# Patient Record
Sex: MALE | Race: WHITE | NOT HISPANIC OR LATINO | Employment: FULL TIME | URBAN - METROPOLITAN AREA
[De-identification: names, ages, dates, MRNs, and addresses within clinical notes are randomized per-mention and may not be internally consistent; named-entity substitution may affect disease eponyms.]

---

## 2019-12-31 ENCOUNTER — HOSPITAL ENCOUNTER (EMERGENCY)
Facility: HOSPITAL | Age: 34
Discharge: HOME | End: 2019-12-31
Attending: EMERGENCY MEDICINE | Admitting: EMERGENCY MEDICINE

## 2019-12-31 ENCOUNTER — APPOINTMENT (EMERGENCY)
Dept: RADIOLOGY | Facility: HOSPITAL | Age: 34
End: 2019-12-31

## 2019-12-31 VITALS
DIASTOLIC BLOOD PRESSURE: 79 MMHG | HEART RATE: 80 BPM | RESPIRATION RATE: 18 BRPM | SYSTOLIC BLOOD PRESSURE: 129 MMHG | TEMPERATURE: 98.2 F | OXYGEN SATURATION: 98 %
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
## 2019-12-31 DIAGNOSIS — S01.01XA LACERATION OF SCALP, INITIAL ENCOUNTER: ICD-10-CM

## 2019-12-31 LAB — GLUCOSE SERPL-MCNC: 83 MG/DL (ref 65–140)

## 2019-12-31 PROCEDURE — 90715 TDAP VACCINE 7 YRS/> IM: CPT | Performed by: EMERGENCY MEDICINE

## 2019-12-31 PROCEDURE — 72125 CT NECK SPINE W/O DYE: CPT

## 2019-12-31 PROCEDURE — 99285 EMERGENCY DEPT VISIT HI MDM: CPT

## 2019-12-31 PROCEDURE — 93005 ELECTROCARDIOGRAM TRACING: CPT

## 2019-12-31 PROCEDURE — 90471 IMMUNIZATION ADMIN: CPT

## 2019-12-31 PROCEDURE — 70450 CT HEAD/BRAIN W/O DYE: CPT

## 2019-12-31 PROCEDURE — 82948 REAGENT STRIP/BLOOD GLUCOSE: CPT

## 2019-12-31 RX ORDER — LIDOCAINE HYDROCHLORIDE AND EPINEPHRINE 10; 10 MG/ML; UG/ML
20 INJECTION, SOLUTION INFILTRATION; PERINEURAL ONCE
Status: COMPLETED | OUTPATIENT
Start: 2019-12-31 | End: 2019-12-31

## 2019-12-31 RX ORDER — CLINDAMYCIN HYDROCHLORIDE 300 MG/1
300 CAPSULE ORAL 3 TIMES DAILY
Qty: 15 CAPSULE | Refills: 0 | Status: SHIPPED | OUTPATIENT
Start: 2019-12-31 | End: 2020-01-05

## 2019-12-31 RX ORDER — DIAPER,BRIEF,INFANT-TODD,DISP
1 EACH MISCELLANEOUS 2 TIMES DAILY
Qty: 453.6 G | Refills: 0 | Status: SHIPPED | OUTPATIENT
Start: 2019-12-31 | End: 2020-01-07

## 2019-12-31 RX ORDER — IBUPROFEN 600 MG/1
600 TABLET ORAL ONCE
Status: COMPLETED | OUTPATIENT
Start: 2019-12-31 | End: 2019-12-31

## 2019-12-31 RX ADMIN — TETANUS TOXOID, REDUCED DIPHTHERIA TOXOID AND ACELLULAR PERTUSSIS VACCINE, ADSORBED 0.5 ML: 5; 2.5; 8; 8; 2.5 SUSPENSION INTRAMUSCULAR at 18:28

## 2019-12-31 RX ADMIN — IBUPROFEN 600 MG: 600 TABLET, FILM COATED ORAL at 19:00

## 2019-12-31 RX ADMIN — LIDOCAINE HYDROCHLORIDE,EPINEPHRINE BITARTRATE 20 ML: 10; .01 INJECTION, SOLUTION INFILTRATION; PERINEURAL at 18:15

## 2019-12-31 NOTE — DISCHARGE INSTRUCTIONS
Please return to the ED in 10 days for suture removal   Keep the area clean apply the bacitracin ointment and taking antibiotics  Watch out for signs of infection such as drainage redness around the wound in which case you need to come to the ED immediately for staple removal   Please gently wash around the wound and keep it from getting crusted
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
## 2020-01-01 NOTE — ED NOTES
Patient's girlfriend at bedside, present when scripts and DC instructions given to patient     Remi Grant RN  12/31/19 0531

## 2020-01-02 LAB
ATRIAL RATE: 82 BPM
P AXIS: 64 DEGREES
PR INTERVAL: 164 MS
QRS AXIS: 44 DEGREES
QRSD INTERVAL: 92 MS
QT INTERVAL: 364 MS
QTC INTERVAL: 425 MS
T WAVE AXIS: 39 DEGREES
VENTRICULAR RATE: 82 BPM

## 2020-01-02 PROCEDURE — 93010 ELECTROCARDIOGRAM REPORT: CPT | Performed by: INTERNAL MEDICINE

## 2021-04-25 ENCOUNTER — APPOINTMENT (EMERGENCY)
Dept: RADIOLOGY | Facility: HOSPITAL | Age: 36
End: 2021-04-25
Payer: COMMERCIAL

## 2021-04-25 ENCOUNTER — HOSPITAL ENCOUNTER (EMERGENCY)
Facility: HOSPITAL | Age: 36
Discharge: HOME | End: 2021-04-25
Attending: SURGERY
Payer: COMMERCIAL

## 2021-04-25 VITALS
OXYGEN SATURATION: 97 % | WEIGHT: 190.7 LBS | TEMPERATURE: 98.8 F | HEART RATE: 86 BPM | SYSTOLIC BLOOD PRESSURE: 132 MMHG | DIASTOLIC BLOOD PRESSURE: 82 MMHG | RESPIRATION RATE: 20 BRPM

## 2021-04-25 DIAGNOSIS — M79.606 LEG PAIN: ICD-10-CM

## 2021-04-25 DIAGNOSIS — S02.5XXA TOOTH FRACTURE: ICD-10-CM

## 2021-04-25 DIAGNOSIS — M79.643 HAND PAIN: ICD-10-CM
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
## 2021-04-25 DIAGNOSIS — K05.219 PERIODONTAL ABSCESS: ICD-10-CM

## 2021-04-25 DIAGNOSIS — M25.529 ELBOW PAIN: ICD-10-CM

## 2021-04-25 LAB
BASE EXCESS BLDA CALC-SCNC: 2 MMOL/L (ref -2–3)
GLUCOSE SERPL-MCNC: 123 MG/DL (ref 65–140)
HCO3 BLDA-SCNC: 27.4 MMOL/L (ref 24–30)
HCT VFR BLD CALC: 44 % (ref 36.5–49.3)
HGB BLDA-MCNC: 15 G/DL (ref 12–17)
PCO2 BLD: 29 MMOL/L (ref 21–32)
PCO2 BLD: 44.9 MM HG (ref 42–50)
PH BLD: 7.39 [PH] (ref 7.3–7.4)
PO2 BLD: 20 MM HG (ref 35–45)
POTASSIUM BLD-SCNC: 4.1 MMOL/L (ref 3.5–5.3)
SAO2 % BLD FROM PO2: 31 % (ref 60–85)
SODIUM BLD-SCNC: 141 MMOL/L (ref 136–145)
SPECIMEN SOURCE: ABNORMAL

## 2021-04-25 PROCEDURE — 93308 TTE F-UP OR LMTD: CPT | Performed by: SURGERY

## 2021-04-25 PROCEDURE — 90471 IMMUNIZATION ADMIN: CPT

## 2021-04-25 PROCEDURE — 76604 US EXAM CHEST: CPT | Performed by: SURGERY

## 2021-04-25 PROCEDURE — 99285 EMERGENCY DEPT VISIT HI MDM: CPT | Performed by: SURGERY

## 2021-04-25 PROCEDURE — 82947 ASSAY GLUCOSE BLOOD QUANT: CPT

## 2021-04-25 PROCEDURE — 82803 BLOOD GASES ANY COMBINATION: CPT

## 2021-04-25 PROCEDURE — 73080 X-RAY EXAM OF ELBOW: CPT

## 2021-04-25 PROCEDURE — 70486 CT MAXILLOFACIAL W/O DYE: CPT

## 2021-04-25 PROCEDURE — 73130 X-RAY EXAM OF HAND: CPT

## 2021-04-25 PROCEDURE — 70450 CT HEAD/BRAIN W/O DYE: CPT

## 2021-04-25 PROCEDURE — NC001 PR NO CHARGE: Performed by: EMERGENCY MEDICINE

## 2021-04-25 PROCEDURE — 90715 TDAP VACCINE 7 YRS/> IM: CPT | Performed by: EMERGENCY MEDICINE

## 2021-04-25 PROCEDURE — 84295 ASSAY OF SERUM SODIUM: CPT

## 2021-04-25 PROCEDURE — 84132 ASSAY OF SERUM POTASSIUM: CPT

## 2021-04-25 PROCEDURE — 99285 EMERGENCY DEPT VISIT HI MDM: CPT

## 2021-04-25 PROCEDURE — 85014 HEMATOCRIT: CPT

## 2021-04-25 PROCEDURE — 76705 ECHO EXAM OF ABDOMEN: CPT | Performed by: SURGERY

## 2021-04-25 PROCEDURE — 73502 X-RAY EXAM HIP UNI 2-3 VIEWS: CPT

## 2021-04-25 PROCEDURE — 72125 CT NECK SPINE W/O DYE: CPT

## 2021-04-25 RX ORDER — LIDOCAINE HYDROCHLORIDE 20 MG/ML
15 SOLUTION OROPHARYNGEAL ONCE
Status: COMPLETED | OUTPATIENT
Start: 2021-04-25 | End: 2021-04-25

## 2021-04-25 RX ORDER — IBUPROFEN 600 MG/1
600 TABLET ORAL ONCE
Status: COMPLETED | OUTPATIENT
Start: 2021-04-25 | End: 2021-04-25

## 2021-04-25 RX ORDER — ACETAMINOPHEN 325 MG/1
975 TABLET ORAL ONCE
Status: COMPLETED | OUTPATIENT
Start: 2021-04-25 | End: 2021-04-25

## 2021-04-25 RX ADMIN — IBUPROFEN 600 MG: 600 TABLET, FILM COATED ORAL at 20:11

## 2021-04-25 RX ADMIN — ACETAMINOPHEN 975 MG: 325 TABLET, COATED ORAL at 20:27

## 2021-04-25 RX ADMIN — LIDOCAINE HYDROCHLORIDE 15 ML: 20 SOLUTION ORAL; TOPICAL at 20:27

## 2021-04-25 RX ADMIN — TETANUS TOXOID, REDUCED DIPHTHERIA TOXOID AND ACELLULAR PERTUSSIS VACCINE, ADSORBED 0.5 ML: 5; 2.5; 8; 8; 2.5 SUSPENSION INTRAMUSCULAR at 19:11
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
## 2021-04-25 NOTE — PROCEDURES
POC FAST US    Date/Time: 4/25/2021 6:52 PM  Performed by: Warden Álvaro DO  Authorized by: Warden Álvaro DO     Patient location:  ED  Procedure details:     Exam Type:  Diagnostic    Indications: blunt abdominal trauma      Assess for:  Hemothorax, intra-abdominal fluid, pericardial effusion and pneumothorax    Technique: extended FAST      Views obtained:  Heart - Pericardial sac, Left thorax, Right thorax, RUQ - Mota's Pouch, LUQ - Splenorenal space and Suprapubic - Pouch of Chuy    Image quality: diagnostic      Image availability:  Images available in PACS  FAST Findings:     RUQ (Hepatorenal) free fluid: absent      LUQ (Splenorenal) free fluid: absent      Suprapubic free fluid: absent      Cardiac wall motion: identified      Pericardial effusion: absent    extended FAST (Pulmonary) findings:     Left lung sliding: Present      Right lung sliding: Present    Interpretation:     Impressions: negative

## 2021-04-26 ENCOUNTER — APPOINTMENT (EMERGENCY)
Dept: CT IMAGING | Facility: HOSPITAL | Age: 36
End: 2021-04-26
Payer: COMMERCIAL

## 2021-04-26 ENCOUNTER — TELEPHONE (OUTPATIENT)
Dept: SURGERY | Facility: CLINIC | Age: 36
End: 2021-04-26

## 2021-04-26 ENCOUNTER — HOSPITAL ENCOUNTER (EMERGENCY)
Facility: HOSPITAL | Age: 36
Discharge: HOME | End: 2021-04-26
Attending: SURGERY | Admitting: SURGERY
Payer: COMMERCIAL

## 2021-04-26 VITALS
WEIGHT: 190 LBS | HEART RATE: 70 BPM | RESPIRATION RATE: 18 BRPM | SYSTOLIC BLOOD PRESSURE: 115 MMHG | TEMPERATURE: 97.9 F | BODY MASS INDEX: 25.07 KG/M2 | DIASTOLIC BLOOD PRESSURE: 75 MMHG | OXYGEN SATURATION: 98 %
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
## 2021-04-26 PROCEDURE — G1004 CDSM NDSC: HCPCS

## 2021-04-26 PROCEDURE — 72192 CT PELVIS W/O DYE: CPT

## 2021-04-26 PROCEDURE — 99284 EMERGENCY DEPT VISIT MOD MDM: CPT | Performed by: SURGERY

## 2021-04-26 PROCEDURE — 99284 EMERGENCY DEPT VISIT MOD MDM: CPT

## 2021-04-26 RX ORDER — IBUPROFEN 200 MG
400 TABLET ORAL EVERY 6 HOURS PRN
Refills: 0
Start: 2021-04-26

## 2021-04-26 RX ORDER — ACETAMINOPHEN 325 MG/1
975 TABLET ORAL EVERY 8 HOURS PRN
Refills: 0
Start: 2021-04-26

## 2021-04-26 NOTE — ED NOTES
Tried to ambulate pt at this time  Pt unable to bare weight on right leg   Trauma made aware     Klaudia Vazquez RN  04/25/21 2059

## 2021-04-26 NOTE — TELEPHONE ENCOUNTER
Patient called at home to discuss radiology finding on R hip XR from ED last night/early this AM  Pt  Still reporting R hip pain  After discussion, patient agreed to return to ED at [facility] for eval/care   Trauma will see patient in ED on his arrival
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
## 2021-04-27 NOTE — DISCHARGE INSTRUCTIONS
You may bear weight on your right leg as tolerated, use crutches for ambulating until you can fully bear weight comfortably  You should follow-up with orthopedics as directed, and OMFS as directed  Continue with liquid diet through a straw as tolerated  Call the Trauma office for any questions or concerns

## 2021-04-28 ENCOUNTER — TELEPHONE (OUTPATIENT)
Dept: OBGYN CLINIC | Facility: HOSPITAL | Age: 36
End: 2021-04-28

## 2021-04-28 NOTE — TELEPHONE ENCOUNTER
Called patient to schedule an appointment with Dr Dariel Vilchis regarding his emergency room visit  Patient states he is feeling better and declines follow up care and does not want to come in  Patient did state that if he has any concerns he will contact us

## 2021-09-22 ENCOUNTER — HOSPITAL ENCOUNTER (EMERGENCY)
Facility: HOSPITAL | Age: 36
Discharge: HOME | End: 2021-09-22
Attending: EMERGENCY MEDICINE
Payer: COMMERCIAL

## 2021-09-22 VITALS
WEIGHT: 190 LBS | RESPIRATION RATE: 16 BRPM | HEART RATE: 89 BPM | HEIGHT: 73 IN | TEMPERATURE: 97.8 F | DIASTOLIC BLOOD PRESSURE: 67 MMHG | BODY MASS INDEX: 25.18 KG/M2 | SYSTOLIC BLOOD PRESSURE: 157 MMHG | OXYGEN SATURATION: 100 %

## 2021-09-22 DIAGNOSIS — K04.7 DENTAL INFECTION: Primary | ICD-10-CM

## 2021-09-22 PROCEDURE — 99284 EMERGENCY DEPT VISIT MOD MDM: CPT | Performed by: EMERGENCY MEDICINE

## 2021-09-22 PROCEDURE — 99283 EMERGENCY DEPT VISIT LOW MDM: CPT

## 2021-09-22 RX ORDER — CLINDAMYCIN HYDROCHLORIDE 300 MG/1
300 CAPSULE ORAL 3 TIMES DAILY
Qty: 30 CAPSULE | Refills: 0 | Status: SHIPPED | OUTPATIENT
Start: 2021-09-22 | End: 2021-10-02

## 2021-09-22 RX ORDER — CLINDAMYCIN HYDROCHLORIDE 150 MG/1
300 CAPSULE ORAL ONCE
Status: COMPLETED | OUTPATIENT
Start: 2021-09-22 | End: 2021-09-22

## 2021-09-22 RX ADMIN — CLINDAMYCIN HYDROCHLORIDE 300 MG: 150 CAPSULE ORAL at 17:49

## 2021-09-22 NOTE — ED PROVIDER NOTES
History  Chief Complaint   Patient presents with    Dental Problem     pt complaining of R sided upper dental mouth swelling  States he doesn't have a dentist but just got insurance and is going to go  Denies pain at this time     43-year-old male presenting with worsening right upper dental and mouth pain and swelling  Patient states still has controlled pain  Symptoms have been ongoing for 2 days  He recently got insurance as crawling looking for a dentist   Patient was concerned because he feels the swelling to the right side of his cheek          Prior to Admission Medications   Prescriptions Last Dose Informant Patient Reported? Taking?   acetaminophen (TYLENOL) 325 mg tablet   No No   Sig: Take 3 tablets (975 mg total) by mouth every 8 (eight) hours as needed for mild pain   bacitracin ointment   No No   Sig: Apply 1 g (1 application total) topically 2 (two) times a day for 7 days   ibuprofen (MOTRIN) 200 mg tablet   No No   Sig: Take 2 tablets (400 mg total) by mouth every 6 (six) hours as needed for mild pain      Facility-Administered Medications: None       History reviewed  No pertinent past medical history  Past Surgical History:   Procedure Laterality Date    KNEE ARTHROSCOPY W/ ACL RECONSTRUCTION Right        History reviewed  No pertinent family history  I have reviewed and agree with the history as documented  E-Cigarette/Vaping    E-Cigarette Use Never User      E-Cigarette/Vaping Substances    Nicotine No     THC No     CBD No     Flavoring No     Other No     Unknown No      Social History     Tobacco Use    Smoking status: Current Every Day Smoker     Packs/day: 1 00    Smokeless tobacco: Never Used   Vaping Use    Vaping Use: Never used   Substance Use Topics    Alcohol use: Yes     Comment: socially    Drug use: No       Review of Systems   Constitutional: Negative for fever  HENT: Positive for dental problem  Eyes: Negative for visual disturbance     Respiratory: Negative for shortness of breath  Cardiovascular: Negative for chest pain  Gastrointestinal: Negative for abdominal pain  Musculoskeletal: Negative for neck pain  Skin: Negative for rash  Neurological: Negative for weakness  Psychiatric/Behavioral: The patient is not nervous/anxious  Physical Exam  Physical Exam  Vitals and nursing note reviewed  Constitutional:       General: He is not in acute distress  HENT:      Head: Normocephalic and atraumatic  Mouth/Throat:      Pharynx: No posterior oropharyngeal erythema  Comments: Dental caries with mild gingival edema to right upper incisors  No palpable abscess  Eyes:      Conjunctiva/sclera: Conjunctivae normal    Cardiovascular:      Rate and Rhythm: Regular rhythm  Pulmonary:      Effort: Pulmonary effort is normal  No respiratory distress  Abdominal:      General: There is no distension  Musculoskeletal:      Cervical back: No rigidity  Skin:     General: Skin is warm and dry  Neurological:      Mental Status: He is alert  Mental status is at baseline     Psychiatric:         Mood and Affect: Mood normal          Vital Signs  ED Triage Vitals [09/22/21 1741]   Temperature Pulse Respirations Blood Pressure SpO2   97 8 °F (36 6 °C) 89 16 157/67 100 %      Temp Source Heart Rate Source Patient Position - Orthostatic VS BP Location FiO2 (%)   Oral Monitor Lying Right arm --      Pain Score       No Pain           Vitals:    09/22/21 1741   BP: 157/67   Pulse: 89   Patient Position - Orthostatic VS: Lying         Visual Acuity      ED Medications  Medications   clindamycin (CLEOCIN) capsule 300 mg (has no administration in time range)       Diagnostic Studies  Results Reviewed     None                 No orders to display              Procedures  Procedures         ED Course                             SBIRT 20yo+      Most Recent Value   SBIRT (22 yo +)   In order to provide better care to our patients, we are screening all of our patients for alcohol and drug use  Would it be okay to ask you these screening questions? No Filed at: 09/22/2021 8784                    [facility]  Number of Diagnoses or Management Options  Diagnosis management comments: 51-year-old male worsening dental swelling and dental pain over the past 2 days  Patient has dental caries  He recently obtained insurance as looking for a dentist   Is allergic to Augmentin  Patient sudden clindamycin  No palpable abscess noted but we discussed strict return precautions  Disposition  Final diagnoses:   Dental infection     Time reflects when diagnosis was documented in both MDM as applicable and the Disposition within this note     Time User Action Codes Description Comment    9/22/2021  5:47 PM Gloria Weeks Add [K02 9] Dental caries     9/22/2021  5:47 PM Gloria Weeks Add [K04 7] Dental infection     9/22/2021  5:48 PM Gloria Weeks Modify [K04 7] Dental infection     9/22/2021  5:48 PM Gloria Weeks Remove [K02 9] Dental caries       ED Disposition     ED Disposition Condition Date/Time Comment    Discharge Stable Wed Sep 22, 2021  5:48 PM Lisset Enriquez discharge to home/self care  Follow-up Information    None         Patient's Medications   Discharge Prescriptions    CLINDAMYCIN (CLEOCIN) 300 MG CAPSULE    Take 1 capsule (300 mg total) by mouth 3 (three) times a day for 30 doses       Start Date: 9/22/2021 End Date: 10/2/2021       Order Dose: 300 mg       Quantity: 30 capsule    Refills: 0     No discharge procedures on file      PDMP Review     None          ED Provider  Electronically Signed by           Alyssia Tellez DO  09/22/21 1745

## 2021-09-22 NOTE — DISCHARGE INSTRUCTIONS
Please return if you develop any worsening symptoms  You may return at any time if you have any further concerns  Please follow up with a dentist as soon as possible

## 2024-10-31 ENCOUNTER — HOSPITAL ENCOUNTER (EMERGENCY)
Facility: HOSPITAL | Age: 39
Discharge: HOME | End: 2024-10-31
Attending: EMERGENCY MEDICINE
Payer: COMMERCIAL

## 2024-10-31 ENCOUNTER — APPOINTMENT (EMERGENCY)
Dept: RADIOLOGY | Facility: HOSPITAL | Age: 39
End: 2024-10-31
Payer: COMMERCIAL

## 2024-10-31 VITALS
DIASTOLIC BLOOD PRESSURE: 65 MMHG | OXYGEN SATURATION: 98 % | TEMPERATURE: 98.2 F | WEIGHT: 185 LBS | HEART RATE: 65 BPM | BODY MASS INDEX: 24.41 KG/M2 | SYSTOLIC BLOOD PRESSURE: 119 MMHG | RESPIRATION RATE: 12 BRPM

## 2024-10-31 DIAGNOSIS — R07.89 ATYPICAL CHEST PAIN: Primary | ICD-10-CM

## 2024-10-31 LAB
ALBUMIN SERPL BCG-MCNC: 4.5 G/DL (ref 3.5–5)
ALP SERPL-CCNC: 66 U/L (ref 34–104)
ALT SERPL W P-5'-P-CCNC: 15 U/L (ref 7–52)
ANION GAP SERPL CALCULATED.3IONS-SCNC: 7 MMOL/L (ref 4–13)
AST SERPL W P-5'-P-CCNC: 15 U/L (ref 13–39)
ATRIAL RATE: 81 BPM
BASOPHILS # BLD AUTO: 0.08 THOUSANDS/ΜL (ref 0–0.1)
BASOPHILS NFR BLD AUTO: 1 % (ref 0–1)
BILIRUB SERPL-MCNC: 0.27 MG/DL (ref 0.2–1)
BUN SERPL-MCNC: 14 MG/DL (ref 5–25)
CALCIUM SERPL-MCNC: 10 MG/DL (ref 8.4–10.2)
CARDIAC TROPONIN I PNL SERPL HS: <2 NG/L
CHLORIDE SERPL-SCNC: 104 MMOL/L (ref 96–108)
CO2 SERPL-SCNC: 29 MMOL/L (ref 21–32)
CREAT SERPL-MCNC: 1.13 MG/DL (ref 0.6–1.3)
D DIMER PPP FEU-MCNC: <0.27 UG/ML FEU
EOSINOPHIL # BLD AUTO: 0.47 THOUSAND/ΜL (ref 0–0.61)
EOSINOPHIL NFR BLD AUTO: 5 % (ref 0–6)
ERYTHROCYTE [DISTWIDTH] IN BLOOD BY AUTOMATED COUNT: 12.6 % (ref 11.6–15.1)
GFR SERPL CREATININE-BSD FRML MDRD: 81 ML/MIN/1.73SQ M
GLUCOSE SERPL-MCNC: 93 MG/DL (ref 65–140)
HCT VFR BLD AUTO: 41.7 % (ref 36.5–49.3)
HGB BLD-MCNC: 14 G/DL (ref 12–17)
IMM GRANULOCYTES # BLD AUTO: 0.02 THOUSAND/UL (ref 0–0.2)
IMM GRANULOCYTES NFR BLD AUTO: 0 % (ref 0–2)
LYMPHOCYTES # BLD AUTO: 3.5 THOUSANDS/ΜL (ref 0.6–4.47)
LYMPHOCYTES NFR BLD AUTO: 36 % (ref 14–44)
MCH RBC QN AUTO: 30.3 PG (ref 26.8–34.3)
MCHC RBC AUTO-ENTMCNC: 33.6 G/DL (ref 31.4–37.4)
MCV RBC AUTO: 90 FL (ref 82–98)
MONOCYTES # BLD AUTO: 0.84 THOUSAND/ΜL (ref 0.17–1.22)
MONOCYTES NFR BLD AUTO: 9 % (ref 4–12)
NEUTROPHILS # BLD AUTO: 4.89 THOUSANDS/ΜL (ref 1.85–7.62)
NEUTS SEG NFR BLD AUTO: 49 % (ref 43–75)
NRBC BLD AUTO-RTO: 0 /100 WBCS
P AXIS: 61 DEGREES
PLATELET # BLD AUTO: 225 THOUSANDS/UL (ref 149–390)
PMV BLD AUTO: 10 FL (ref 8.9–12.7)
POTASSIUM SERPL-SCNC: 3.8 MMOL/L (ref 3.5–5.3)
PR INTERVAL: 154 MS
PROT SERPL-MCNC: 7 G/DL (ref 6.4–8.4)
QRS AXIS: 53 DEGREES
QRSD INTERVAL: 92 MS
QT INTERVAL: 360 MS
QTC INTERVAL: 418 MS
RBC # BLD AUTO: 4.62 MILLION/UL (ref 3.88–5.62)
SODIUM SERPL-SCNC: 140 MMOL/L (ref 135–147)
T WAVE AXIS: 51 DEGREES
VENTRICULAR RATE: 81 BPM
WBC # BLD AUTO: 9.8 THOUSAND/UL (ref 4.31–10.16)

## 2024-10-31 PROCEDURE — 96374 THER/PROPH/DIAG INJ IV PUSH: CPT

## 2024-10-31 PROCEDURE — 71045 X-RAY EXAM CHEST 1 VIEW: CPT

## 2024-10-31 PROCEDURE — 93005 ELECTROCARDIOGRAM TRACING: CPT

## 2024-10-31 PROCEDURE — 85025 COMPLETE CBC W/AUTO DIFF WBC: CPT | Performed by: EMERGENCY MEDICINE

## 2024-10-31 PROCEDURE — 85379 FIBRIN DEGRADATION QUANT: CPT | Performed by: EMERGENCY MEDICINE

## 2024-10-31 PROCEDURE — 99285 EMERGENCY DEPT VISIT HI MDM: CPT | Performed by: EMERGENCY MEDICINE

## 2024-10-31 PROCEDURE — 99285 EMERGENCY DEPT VISIT HI MDM: CPT

## 2024-10-31 PROCEDURE — 36415 COLL VENOUS BLD VENIPUNCTURE: CPT

## 2024-10-31 PROCEDURE — 80053 COMPREHEN METABOLIC PANEL: CPT | Performed by: EMERGENCY MEDICINE

## 2024-10-31 PROCEDURE — 94640 AIRWAY INHALATION TREATMENT: CPT

## 2024-10-31 PROCEDURE — 84484 ASSAY OF TROPONIN QUANT: CPT | Performed by: EMERGENCY MEDICINE

## 2024-10-31 PROCEDURE — 93010 ELECTROCARDIOGRAM REPORT: CPT | Performed by: INTERNAL MEDICINE

## 2024-10-31 RX ORDER — IPRATROPIUM BROMIDE AND ALBUTEROL SULFATE 2.5; .5 MG/3ML; MG/3ML
3 SOLUTION RESPIRATORY (INHALATION) ONCE
Status: COMPLETED | OUTPATIENT
Start: 2024-10-31 | End: 2024-10-31

## 2024-10-31 RX ORDER — METHYLPREDNISOLONE SODIUM SUCCINATE 125 MG/2ML
80 INJECTION, POWDER, LYOPHILIZED, FOR SOLUTION INTRAMUSCULAR; INTRAVENOUS ONCE
Status: COMPLETED | OUTPATIENT
Start: 2024-10-31 | End: 2024-10-31

## 2024-10-31 RX ORDER — ALBUTEROL SULFATE 90 UG/1
2 INHALANT RESPIRATORY (INHALATION) ONCE
Status: COMPLETED | OUTPATIENT
Start: 2024-10-31 | End: 2024-10-31

## 2024-10-31 RX ORDER — PREDNISONE 20 MG/1
40 TABLET ORAL DAILY
Qty: 8 TABLET | Refills: 0 | Status: SHIPPED | OUTPATIENT
Start: 2024-10-31 | End: 2024-11-04

## 2024-10-31 RX ADMIN — METHYLPREDNISOLONE SODIUM SUCCINATE 80 MG: 125 INJECTION, POWDER, FOR SOLUTION INTRAMUSCULAR; INTRAVENOUS at 22:44

## 2024-10-31 RX ADMIN — ALBUTEROL SULFATE 2 PUFF: 90 AEROSOL, METERED RESPIRATORY (INHALATION) at 22:48

## 2024-10-31 RX ADMIN — IPRATROPIUM BROMIDE AND ALBUTEROL SULFATE 3 ML: .5; 3 SOLUTION RESPIRATORY (INHALATION) at 22:44

## 2024-10-31 NOTE — Clinical Note
Alli Enriquez was seen and treated in our emergency department on 10/31/2024.                Diagnosis:     Alli  may return to work on return date.    He may return on this date: 11/04/2024         If you have any questions or concerns, please don't hesitate to call.      Nanci Huston MD    ______________________________           _______________          _______________  Hospital Representative                              Date                                Time

## 2024-11-01 NOTE — DISCHARGE INSTRUCTIONS
Your tests are ok at this time.  Take the anti-inflammatory medicine (prednisone) as prescribed.  Use the inhaler 2 puffs every 4-6 hours as needed.  You can also use tylenol and heating pad as needed for discomfort.    Rest as needed.  Stop smoking.   Follow up with the cardiologist and/or your primary care doctor for further evaluation.

## 2024-11-01 NOTE — ED PROVIDER NOTES
Time reflects when diagnosis was documented in both MDM as applicable and the Disposition within this note       Time User Action Codes Description Comment    10/31/2024 10:41 PM Nanci Huston Add [R07.89] Atypical chest pain           ED Disposition       ED Disposition   Discharge    Condition   Stable    Date/Time   u Oct 31, 2024 10:41 PM    Comment   Alli Enriquez discharge to home/self care.                   Assessment & Plan       Medical Decision Making  Differential includes but not limited to ACS, PE, chest wall pain, pleurisy, infection, COPD, anxiety.  2235 - Evaluation negative including normal trop, Ddimer and EKG and chest xray.  Likely pleuritic pain with possible COPD.  Will give duoneb and steroid and plan to discharge on prednisone and albuterol inhaler.  Advised tylenol, heating pad and stop smoking.  Ambulatory referral placed to cardiology for further evaluation.    Amount and/or Complexity of Data Reviewed  Labs: ordered.  Radiology: ordered and independent interpretation performed.    Risk  Prescription drug management.             Medications   ipratropium-albuterol (DUO-NEB) 0.5-2.5 mg/3 mL inhalation solution 3 mL (3 mL Nebulization Given 10/31/24 2244)   methylPREDNISolone sodium succinate (Solu-MEDROL) injection 80 mg (80 mg Intravenous Given 10/31/24 2244)   albuterol (PROVENTIL HFA,VENTOLIN HFA) inhaler 2 puff (2 puffs Inhalation Given 10/31/24 2248)       ED Risk Strat Scores   HEART Risk Score      Flowsheet Row Most Recent Value   Heart Score Risk Calculator    History 1 Filed at: 10/31/2024 2237   ECG 0 Filed at: 10/31/2024 2237   Age 0 Filed at: 10/31/2024 2237   Risk Factors 1 Filed at: 10/31/2024 2237   Troponin 0 Filed at: 10/31/2024 2237   HEART Score 2 Filed at: 10/31/2024 2237                               SBIRT 22yo+      Flowsheet Row Most Recent Value   Initial Alcohol Screen: US AUDIT-C     1. How often do you have a drink containing alcohol? 0 Filed at:  10/31/2024 2127   2. How many drinks containing alcohol do you have on a typical day you are drinking?  0 Filed at: 10/31/2024 2127   3a. Male UNDER 65: How often do you have five or more drinks on one occasion? 0 Filed at: 10/31/2024 2127   Audit-C Score 0 Filed at: 10/31/2024 2127   CARA: How many times in the past year have you...    Used an illegal drug or used a prescription medication for non-medical reasons? Never Filed at: 10/31/2024 2127                            History of Present Illness       Chief Complaint   Patient presents with    Chest Pain     Patient c/o constant left side stabbing cp, starting 6 hours pta, did not take any medications, worsens with deep breath, radiating to the back, denies n/v       History reviewed. No pertinent past medical history.   Past Surgical History:   Procedure Laterality Date    KNEE ARTHROSCOPY W/ ACL RECONSTRUCTION Right       History reviewed. No pertinent family history.   Social History     Tobacco Use    Smoking status: Every Day     Current packs/day: 1.00     Types: Cigarettes    Smokeless tobacco: Never   Vaping Use    Vaping status: Never Used   Substance Use Topics    Alcohol use: Never     Comment: socially    Drug use: Yes     Frequency: 7.0 times per week     Types: Marijuana      E-Cigarette/Vaping    E-Cigarette Use Never User       E-Cigarette/Vaping Substances    Nicotine No     THC No     CBD No     Flavoring No     Other No     Unknown No       I have reviewed and agree with the history as documented.     37 yo male c/o left anterior chest pain, initially has been off and on for the past few days, then today has been constant for the past 6 hours while at work.  Pain is described as sharp, radiating through to back.  + mild sob and hurts to take a deep breath.  No nausea or vomiting.  No fever or cough.  + smoker.  No h/o HTN or high chol. But pt. Doesn't regularly go to doctors.      History provided by:  Patient   used: No     Chest Pain  Associated symptoms: shortness of breath    Associated symptoms: no abdominal pain, no back pain, no cough, no dizziness, no fever, no headache, no nausea and not vomiting        Review of Systems   Constitutional: Negative.  Negative for chills and fever.   HENT: Negative.  Negative for congestion and sore throat.    Eyes: Negative.    Respiratory:  Positive for shortness of breath. Negative for cough.    Cardiovascular:  Positive for chest pain. Negative for leg swelling.   Gastrointestinal: Negative.  Negative for abdominal pain, diarrhea, nausea and vomiting.   Genitourinary: Negative.  Negative for dysuria, flank pain and hematuria.   Musculoskeletal: Negative.  Negative for back pain and myalgias.   Skin: Negative.  Negative for rash and wound.   Neurological: Negative.  Negative for dizziness and headaches.   Psychiatric/Behavioral: Negative.  Negative for confusion and hallucinations. The patient is not nervous/anxious.    All other systems reviewed and are negative.          Objective       ED Triage Vitals   Temperature Pulse Blood Pressure Respirations SpO2 Patient Position - Orthostatic VS   10/31/24 2124 10/31/24 2124 10/31/24 2124 10/31/24 2124 10/31/24 2124 10/31/24 2200   98.2 °F (36.8 °C) 81 157/99 18 100 % Sitting      Temp Source Heart Rate Source BP Location FiO2 (%) Pain Score    10/31/24 2124 10/31/24 2124 10/31/24 2124 -- 10/31/24 2124    Oral Monitor Right arm  5      Vitals      Date and Time Temp Pulse SpO2 Resp BP Pain Score FACES Pain Rating User   10/31/24 2215 -- 68 95 % 17 110/66 -- -- [facility]   10/31/24 2200 -- 69 95 % 20 107/65 -- -- [facility]   10/31/24 2124 98.2 °F (36.8 °C) 81 100 % 18 157/99 5 -- [facility]            Physical Exam  Vitals and nursing note reviewed.   Constitutional:       General: He is not in acute distress.     Appearance: He is well-developed. He is not ill-appearing or diaphoretic.   HENT:      Head: Normocephalic and atraumatic.   Eyes:      General: No scleral  icterus.     Conjunctiva/sclera: Conjunctivae normal.   Cardiovascular:      Rate and Rhythm: Normal rate and regular rhythm.      Heart sounds: Normal heart sounds. No murmur heard.  Pulmonary:      Effort: Pulmonary effort is normal. No respiratory distress.      Breath sounds: Wheezing present.   Chest:      Chest wall: Tenderness present.   Abdominal:      General: Bowel sounds are normal. There is no distension.      Palpations: Abdomen is soft.      Tenderness: There is no abdominal tenderness.   Musculoskeletal:         General: No tenderness or deformity. Normal range of motion.      Cervical back: Normal range of motion and neck supple.      Right lower leg: No edema.      Left lower leg: No edema.   Skin:     General: Skin is warm and dry.      Coloration: Skin is not pale.      Findings: No erythema or rash.   Neurological:      General: No focal deficit present.      Mental Status: He is alert and oriented to person, place, and time.      Cranial Nerves: No cranial nerve deficit.   Psychiatric:         Mood and Affect: Mood normal.         Behavior: Behavior normal.         Results Reviewed       Procedure Component Value Units Date/Time    D-dimer, quantitative [[id number]]  (Normal) Collected: 10/31/24 2145    Lab Status: Final result Specimen: Blood from Arm, Left Updated: 10/31/24 2213     D-Dimer, Quant <0.27 ug/ml FEU     HS Troponin 0hr (reflex protocol) [[id number]]  (Normal) Collected: 10/31/24 2130    Lab Status: Final result Specimen: Blood from Arm, Left Updated: 10/31/24 2200     hs TnI 0hr <2 ng/L     Comprehensive metabolic panel [[id number]] Collected: 10/31/24 2130    Lab Status: Final result Specimen: Blood from Arm, Left Updated: 10/31/24 2152     Sodium 140 mmol/L      Potassium 3.8 mmol/L      Chloride 104 mmol/L      CO2 29 mmol/L      ANION GAP 7 mmol/L      BUN 14 mg/dL      Creatinine 1.13 mg/dL      Glucose 93 mg/dL      Calcium 10.0 mg/dL      AST 15 U/L      ALT 15 U/L       Alkaline Phosphatase 66 U/L      Total Protein 7.0 g/dL      Albumin 4.5 g/dL      Total Bilirubin 0.27 mg/dL      eGFR 81 ml/min/1.73sq m     Narrative:      National Kid[employer] guidelines for Chronic Kidney Disease (CKD):     Stage 1 with normal or high GFR (GFR > 90 mL/min/1.73 square meters)    Stage 2 Mild CKD (GFR = 60-89 mL/min/1.73 square meters)    Stage 3A Moderate CKD (GFR = 45-59 mL/min/1.73 square meters)    Stage 3B Moderate CKD (GFR = 30-44 mL/min/1.73 square meters)    Stage 4 Severe CKD (GFR = 15-29 mL/min/1.73 square meters)    Stage 5 End Stage CKD (GFR <15 mL/min/1.73 square meters)  Note: GFR calculation is accurate only with a steady state creatinine    CBC and differential [[id number]] Collected: 10/31/24 2130    Lab Status: Final result Specimen: Blood from Arm, Left Updated: 10/31/24 2135     WBC 9.80 Thousand/uL      RBC 4.62 Million/uL      Hemoglobin 14.0 g/dL      Hematocrit 41.7 %      MCV 90 fL      MCH 30.3 pg      MCHC 33.6 g/dL      RDW 12.6 %      MPV 10.0 fL      Platelets 225 Thousands/uL      nRBC 0 /100 WBCs      Segmented % 49 %      Immature Grans % 0 %      Lymphocytes % 36 %      Monocytes % 9 %      Eosinophils Relative 5 %      Basophils Relative 1 %      Absolute Neutrophils 4.89 Thousands/µL      Absolute Immature Grans 0.02 Thousand/uL      Absolute Lymphocytes 3.50 Thousands/µL      Absolute Monocytes 0.84 Thousand/µL      Eosinophils Absolute 0.47 Thousand/µL      Basophils Absolute 0.08 Thousands/µL             XR chest 1 view portable   ED Interpretation by Nanci Huston MD (10/31 2228)   NAD          ECG 12 Lead Documentation Only    Date/Time: 10/31/2024 9:21 PM    Performed by: Nanci Huston MD  Authorized by: Nanci Huston MD    Indications / Diagnosis:  Chest pain  ECG reviewed by me, the ED Provider: yes    Patient location:  ED  Previous ECG:     Previous ECG:  Unavailable  Interpretation:     Interpretation: normal    Rate:     ECG rate:   81    ECG rate assessment: normal    Rhythm:     Rhythm: sinus rhythm    Ectopy:     Ectopy: none    QRS:     QRS axis:  Normal  Conduction:     Conduction: normal    ST segments:     ST segments:  Normal  T waves:     T waves: normal        ED Medication and Procedure Management   Prior to Admission Medications   Prescriptions Last Dose Informant Patient Reported? Taking?   acetaminophen (TYLENOL) 325 mg tablet   No No   Sig: Take 3 tablets (975 mg total) by mouth every 8 (eight) hours as needed for mild pain   bacitracin ointment   No No   Sig: Apply 1 g (1 application total) topically 2 (two) times a day for 7 days   ibuprofen (MOTRIN) 200 mg tablet   No No   Sig: Take 2 tablets (400 mg total) by mouth every 6 (six) hours as needed for mild pain      Facility-Administered Medications: None     Patient's Medications   Discharge Prescriptions    PREDNISONE 20 MG TABLET    Take 2 tablets (40 mg total) by mouth daily for 4 days       Start Date: 10/31/2024End Date: 11/4/2024       Order Dose: 40 mg       Quantity: 8 tablet    Refills: 0       ED SEPSIS DOCUMENTATION   Time reflects when diagnosis was documented in both MDM as applicable and the Disposition within this note       Time User Action Codes Description Comment    10/31/2024 10:41 PM Nanci Huston Add [R07.89] Atypical chest pain                  Nanci Huston MD  10/31/24 3184